# Patient Record
Sex: MALE | Race: OTHER | NOT HISPANIC OR LATINO | URBAN - METROPOLITAN AREA
[De-identification: names, ages, dates, MRNs, and addresses within clinical notes are randomized per-mention and may not be internally consistent; named-entity substitution may affect disease eponyms.]

---

## 2018-03-25 ENCOUNTER — INPATIENT (INPATIENT)
Facility: HOSPITAL | Age: 38
LOS: 2 days | Discharge: LEFT BEFORE DISCHARGE | End: 2018-03-28
Attending: INTERNAL MEDICINE

## 2018-03-25 VITALS
HEART RATE: 94 BPM | TEMPERATURE: 97 F | SYSTOLIC BLOOD PRESSURE: 139 MMHG | RESPIRATION RATE: 18 BRPM | OXYGEN SATURATION: 99 % | DIASTOLIC BLOOD PRESSURE: 81 MMHG

## 2018-03-25 LAB
ALBUMIN SERPL ELPH-MCNC: 4.5 G/DL — SIGNIFICANT CHANGE UP (ref 3.5–5.2)
ALP SERPL-CCNC: 150 U/L — HIGH (ref 30–115)
ALT FLD-CCNC: 47 U/L — HIGH (ref 0–41)
ANION GAP SERPL CALC-SCNC: 19 MMOL/L — HIGH (ref 7–14)
APAP SERPL-MCNC: <5 UG/ML — LOW (ref 10–30)
APPEARANCE UR: CLEAR — SIGNIFICANT CHANGE UP
AST SERPL-CCNC: 128 U/L — HIGH (ref 0–41)
BASOPHILS # BLD AUTO: 0.07 K/UL — SIGNIFICANT CHANGE UP (ref 0–0.2)
BASOPHILS NFR BLD AUTO: 1.3 % — HIGH (ref 0–1)
BILIRUB SERPL-MCNC: 0.8 MG/DL — SIGNIFICANT CHANGE UP (ref 0.2–1.2)
BILIRUB UR-MCNC: NEGATIVE — SIGNIFICANT CHANGE UP
BUN SERPL-MCNC: 12 MG/DL — SIGNIFICANT CHANGE UP (ref 10–20)
CALCIUM SERPL-MCNC: 8.5 MG/DL — SIGNIFICANT CHANGE UP (ref 8.5–10.1)
CHLORIDE SERPL-SCNC: 99 MMOL/L — SIGNIFICANT CHANGE UP (ref 98–110)
CO2 SERPL-SCNC: 25 MMOL/L — SIGNIFICANT CHANGE UP (ref 17–32)
COLOR SPEC: YELLOW — SIGNIFICANT CHANGE UP
CREAT SERPL-MCNC: 0.7 MG/DL — SIGNIFICANT CHANGE UP (ref 0.7–1.5)
DIFF PNL FLD: (no result)
EOSINOPHIL # BLD AUTO: 0.07 K/UL — SIGNIFICANT CHANGE UP (ref 0–0.7)
EOSINOPHIL NFR BLD AUTO: 1.3 % — SIGNIFICANT CHANGE UP (ref 0–8)
ETHANOL SERPL-MCNC: 417 MG/DL — HIGH
GLUCOSE SERPL-MCNC: 100 MG/DL — HIGH (ref 70–99)
GLUCOSE UR QL: NEGATIVE MG/DL — SIGNIFICANT CHANGE UP
HCT VFR BLD CALC: 43.4 % — SIGNIFICANT CHANGE UP (ref 42–52)
HGB BLD-MCNC: 14.9 G/DL — SIGNIFICANT CHANGE UP (ref 14–18)
IMM GRANULOCYTES NFR BLD AUTO: 0.2 % — SIGNIFICANT CHANGE UP (ref 0.1–0.3)
KETONES UR-MCNC: NEGATIVE — SIGNIFICANT CHANGE UP
LEUKOCYTE ESTERASE UR-ACNC: NEGATIVE — SIGNIFICANT CHANGE UP
LYMPHOCYTES # BLD AUTO: 2.31 K/UL — SIGNIFICANT CHANGE UP (ref 1.2–3.4)
LYMPHOCYTES # BLD AUTO: 42.1 % — SIGNIFICANT CHANGE UP (ref 20.5–51.1)
MCHC RBC-ENTMCNC: 32.8 PG — HIGH (ref 27–31)
MCHC RBC-ENTMCNC: 34.3 G/DL — SIGNIFICANT CHANGE UP (ref 32–37)
MCV RBC AUTO: 95.6 FL — HIGH (ref 80–94)
MONOCYTES # BLD AUTO: 0.65 K/UL — HIGH (ref 0.1–0.6)
MONOCYTES NFR BLD AUTO: 11.8 % — HIGH (ref 1.7–9.3)
NEUTROPHILS # BLD AUTO: 2.38 K/UL — SIGNIFICANT CHANGE UP (ref 1.4–6.5)
NEUTROPHILS NFR BLD AUTO: 43.3 % — SIGNIFICANT CHANGE UP (ref 42.2–75.2)
NITRITE UR-MCNC: NEGATIVE — SIGNIFICANT CHANGE UP
PH UR: 6 — SIGNIFICANT CHANGE UP (ref 5–8)
PLATELET # BLD AUTO: 195 K/UL — SIGNIFICANT CHANGE UP (ref 130–400)
POTASSIUM SERPL-MCNC: 3.4 MMOL/L — LOW (ref 3.5–5)
POTASSIUM SERPL-SCNC: 3.4 MMOL/L — LOW (ref 3.5–5)
PROT SERPL-MCNC: 8 G/DL — SIGNIFICANT CHANGE UP (ref 6–8)
PROT UR-MCNC: NEGATIVE MG/DL — SIGNIFICANT CHANGE UP
RBC # BLD: 4.54 M/UL — LOW (ref 4.7–6.1)
RBC # FLD: 13.9 % — SIGNIFICANT CHANGE UP (ref 11.5–14.5)
SALICYLATES SERPL-MCNC: <0.3 MG/DL — LOW (ref 4–30)
SODIUM SERPL-SCNC: 143 MMOL/L — SIGNIFICANT CHANGE UP (ref 135–146)
SP GR SPEC: 1.01 — SIGNIFICANT CHANGE UP (ref 1.01–1.03)
UROBILINOGEN FLD QL: 0.2 MG/DL — SIGNIFICANT CHANGE UP (ref 0.2–0.2)
WBC # BLD: 5.49 K/UL — SIGNIFICANT CHANGE UP (ref 4.8–10.8)
WBC # FLD AUTO: 5.49 K/UL — SIGNIFICANT CHANGE UP (ref 4.8–10.8)

## 2018-03-25 RX ORDER — IBUPROFEN 200 MG
400 TABLET ORAL
Qty: 0 | Refills: 0 | Status: DISCONTINUED | OUTPATIENT
Start: 2018-03-25 | End: 2018-03-28

## 2018-03-25 RX ORDER — THIAMINE MONONITRATE (VIT B1) 100 MG
100 TABLET ORAL DAILY
Qty: 0 | Refills: 0 | Status: COMPLETED | OUTPATIENT
Start: 2018-03-25 | End: 2018-03-28

## 2018-03-25 RX ORDER — HYDROXYZINE HCL 10 MG
100 TABLET ORAL AT BEDTIME
Qty: 0 | Refills: 0 | Status: DISCONTINUED | OUTPATIENT
Start: 2018-03-25 | End: 2018-03-28

## 2018-03-25 RX ORDER — POTASSIUM CHLORIDE 20 MEQ
40 PACKET (EA) ORAL ONCE
Qty: 0 | Refills: 0 | Status: COMPLETED | OUTPATIENT
Start: 2018-03-25 | End: 2018-03-25

## 2018-03-25 RX ORDER — TUBERCULIN PURIFIED PROTEIN DERIVATIVE 5 [IU]/.1ML
5 INJECTION, SOLUTION INTRADERMAL ONCE
Qty: 0 | Refills: 0 | Status: COMPLETED | OUTPATIENT
Start: 2018-03-25 | End: 2018-03-25

## 2018-03-25 RX ORDER — SODIUM CHLORIDE 9 MG/ML
1000 INJECTION INTRAMUSCULAR; INTRAVENOUS; SUBCUTANEOUS
Qty: 0 | Refills: 0 | Status: DISCONTINUED | OUTPATIENT
Start: 2018-03-25 | End: 2018-03-26

## 2018-03-25 RX ORDER — HYDROXYZINE HCL 10 MG
50 TABLET ORAL
Qty: 0 | Refills: 0 | Status: DISCONTINUED | OUTPATIENT
Start: 2018-03-25 | End: 2018-03-28

## 2018-03-25 RX ADMIN — Medication 25 MILLIGRAM(S): at 14:11

## 2018-03-25 RX ADMIN — Medication 25 MILLIGRAM(S): at 10:18

## 2018-03-25 RX ADMIN — Medication 25 MILLIGRAM(S): at 22:01

## 2018-03-25 RX ADMIN — Medication 1 TABLET(S): at 15:06

## 2018-03-25 RX ADMIN — Medication 25 MILLIGRAM(S): at 06:05

## 2018-03-25 RX ADMIN — Medication 25 MILLIGRAM(S): at 16:10

## 2018-03-25 RX ADMIN — Medication 25 MILLIGRAM(S): at 19:58

## 2018-03-25 RX ADMIN — Medication 100 MILLIGRAM(S): at 15:06

## 2018-03-25 RX ADMIN — Medication 25 MILLIGRAM(S): at 17:55

## 2018-03-25 RX ADMIN — Medication 40 MILLIEQUIVALENT(S): at 12:11

## 2018-03-25 RX ADMIN — Medication 25 MILLIGRAM(S): at 12:11

## 2018-03-25 RX ADMIN — Medication 25 MILLIGRAM(S): at 03:53

## 2018-03-25 RX ADMIN — TUBERCULIN PURIFIED PROTEIN DERIVATIVE 5 UNIT(S): 5 INJECTION, SOLUTION INTRADERMAL at 14:52

## 2018-03-25 NOTE — ED PROVIDER NOTE - NS ED ROS FT
Constitutional: See HPI.  Eyes: No visual changes, eye pain or discharge.  ENMT: No hearing changes, pain, discharge or infections. No neck pain or stiffness.  Cardiac: No chest pain, SOB or edema. No chest pain with exertion.  Respiratory: No cough or respiratory distress.   GI: No nausea, vomiting, diarrhea or abdominal pain.  : No dysuria, frequency or burning.  MS: No myalgia, muscle weakness, joint pain or back pain.  Neuro: No headache or weakness. No LOC.  Skin: No skin rash.

## 2018-03-25 NOTE — ED PROVIDER NOTE - OBJECTIVE STATEMENT
38 yo M with a hx of alcoholic hepatitis, pancreatitis, chronic etoh abuse, presents for alcohol detox. Has been drinking 1L of vodka and 10-20 beers daily for the past 3 months. Last drink was 6 hours ago. Denies CP, sob, fevers, chills, tremulousness, abd pain, NVCD, back pain.

## 2018-03-25 NOTE — ED ADULT NURSE REASSESSMENT NOTE - NS ED NURSE REASSESS COMMENT FT1
librium 25mg PO given per Dr Santos, EMS here for pt to transport to [facility] detox, pt cooperative, no issues, no complaints

## 2018-03-25 NOTE — ED PROVIDER NOTE - PHYSICAL EXAMINATION
AOx4, Non toxic appearing, NAD, speaking in full sentences. Skin  warm and dry, no acute rash. Head - ecchymosis and scabbing over nose nand upper lip, healing. PERRLA/EOMI, conjunctiva and sclera clear. MM moist, no nasal discharge.  Pharynx and TM's unremarkable.  No mastoid or temporal ttp. Neck supple nt, no meningeal signs. Heart RRR s1s2 nl, no rub/murmur. Lungs- No retractions, BS equal, CTAB. Abdomen soft ntnd no r/g. Extremities- moves all, +equal distal pulses, brisk cap refill, sensation wnl, normal ROM. No LE edema, calves nttp b/l.

## 2018-03-25 NOTE — ED PROVIDER NOTE - ATTENDING CONTRIBUTION TO CARE
36 y/o M pmh above, here for etoh detox, last drink about 6hr ago.  No SI, HI or hallucinations.  NO complaints; + healing abrasions on nose and upper lip from fall days ago, o/w unremarkable exam.  P: labs, ekg, benzo, send to detox.

## 2018-03-26 LAB — DRUG SCREEN 1, URINE RESULT: SIGNIFICANT CHANGE UP

## 2018-03-26 RX ADMIN — Medication 50 MILLIGRAM(S): at 14:02

## 2018-03-26 RX ADMIN — Medication 25 MILLIGRAM(S): at 00:06

## 2018-03-26 RX ADMIN — Medication 25 MILLIGRAM(S): at 16:04

## 2018-03-26 RX ADMIN — Medication 25 MILLIGRAM(S): at 08:09

## 2018-03-26 RX ADMIN — Medication 25 MILLIGRAM(S): at 18:13

## 2018-03-26 RX ADMIN — Medication 50 MILLIGRAM(S): at 06:17

## 2018-03-26 RX ADMIN — Medication 100 MILLIGRAM(S): at 21:11

## 2018-03-26 RX ADMIN — Medication 100 MILLIGRAM(S): at 00:06

## 2018-03-26 RX ADMIN — Medication 25 MILLIGRAM(S): at 02:11

## 2018-03-26 RX ADMIN — Medication 25 MILLIGRAM(S): at 06:17

## 2018-03-26 RX ADMIN — Medication 25 MILLIGRAM(S): at 14:02

## 2018-03-26 RX ADMIN — Medication 100 MILLIGRAM(S): at 08:09

## 2018-03-26 RX ADMIN — Medication 1 TABLET(S): at 08:09

## 2018-03-26 RX ADMIN — Medication 25 MILLIGRAM(S): at 09:54

## 2018-03-27 RX ORDER — MUPIROCIN 20 MG/G
1 OINTMENT TOPICAL
Qty: 0 | Refills: 0 | Status: DISCONTINUED | OUTPATIENT
Start: 2018-03-27 | End: 2018-03-28

## 2018-03-27 RX ADMIN — Medication 1 TABLET(S): at 09:22

## 2018-03-27 RX ADMIN — TUBERCULIN PURIFIED PROTEIN DERIVATIVE 5 UNIT(S): 5 INJECTION, SOLUTION INTRADERMAL at 13:15

## 2018-03-27 RX ADMIN — Medication 100 MILLIGRAM(S): at 21:08

## 2018-03-27 RX ADMIN — Medication 25 MILLIGRAM(S): at 09:22

## 2018-03-27 RX ADMIN — Medication 50 MILLIGRAM(S): at 00:11

## 2018-03-27 RX ADMIN — Medication 100 MILLIGRAM(S): at 09:22

## 2018-03-27 RX ADMIN — Medication 25 MILLIGRAM(S): at 00:10

## 2018-03-27 RX ADMIN — Medication 30 MILLILITER(S): at 06:33

## 2018-03-27 RX ADMIN — MUPIROCIN 1 APPLICATION(S): 20 OINTMENT TOPICAL at 21:25

## 2018-03-27 RX ADMIN — Medication 25 MILLIGRAM(S): at 06:32

## 2018-03-27 RX ADMIN — MUPIROCIN 1 APPLICATION(S): 20 OINTMENT TOPICAL at 12:10

## 2018-03-28 VITALS
TEMPERATURE: 96 F | DIASTOLIC BLOOD PRESSURE: 92 MMHG | SYSTOLIC BLOOD PRESSURE: 139 MMHG | RESPIRATION RATE: 16 BRPM | HEART RATE: 118 BPM

## 2018-03-28 RX ADMIN — Medication 100 MILLIGRAM(S): at 09:19

## 2018-03-28 RX ADMIN — Medication 25 MILLIGRAM(S): at 09:27

## 2018-03-28 RX ADMIN — Medication 1 TABLET(S): at 09:19

## 2018-03-28 RX ADMIN — Medication 50 MILLIGRAM(S): at 09:28

## 2018-03-29 LAB
AMPHETAMINES BLD QL SCN: NEGATIVE — SIGNIFICANT CHANGE UP
BARBITURATES SERPLBLD QL: NEGATIVE — SIGNIFICANT CHANGE UP
BENZODIAZAPINES, SERUM: POSITIVE
CANNABINOIDS SERPLBLD QL SCN: NEGATIVE — SIGNIFICANT CHANGE UP
COCAINE+BZE SERPLBLD QL SCN: NEGATIVE — SIGNIFICANT CHANGE UP
METHADONE SERPL-MCNC: NEGATIVE — SIGNIFICANT CHANGE UP
OPIATES SERPL QL: NEGATIVE — SIGNIFICANT CHANGE UP
PCP BLD QL SCN: NEGATIVE — SIGNIFICANT CHANGE UP

## 2018-04-02 DIAGNOSIS — Z86.19 PERSONAL HISTORY OF OTHER INFECTIOUS AND PARASITIC DISEASES: ICD-10-CM

## 2018-04-02 DIAGNOSIS — F10.20 ALCOHOL DEPENDENCE, UNCOMPLICATED: ICD-10-CM

## 2018-04-02 DIAGNOSIS — Z87.19 PERSONAL HISTORY OF OTHER DISEASES OF THE DIGESTIVE SYSTEM: ICD-10-CM

## 2018-04-02 DIAGNOSIS — G47.00 INSOMNIA, UNSPECIFIED: ICD-10-CM

## 2018-04-02 DIAGNOSIS — E87.6 HYPOKALEMIA: ICD-10-CM

## 2018-04-02 DIAGNOSIS — R25.1 TREMOR, UNSPECIFIED: ICD-10-CM
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
## 2018-04-02 DIAGNOSIS — F41.9 ANXIETY DISORDER, UNSPECIFIED: ICD-10-CM

## 2018-10-01 ENCOUNTER — OUTPATIENT (OUTPATIENT)
Dept: OUTPATIENT SERVICES | Facility: HOSPITAL | Age: 38
LOS: 1 days | End: 2018-10-01
Payer: MEDICAID

## 2018-10-01 PROCEDURE — G9001: CPT

## 2018-10-12 DIAGNOSIS — Z71.89 OTHER SPECIFIED COUNSELING: ICD-10-CM

## 2018-12-31 ENCOUNTER — INPATIENT (INPATIENT)
Facility: HOSPITAL | Age: 38
LOS: 3 days | Discharge: NURSING, REHAB OR LONG-TERM CARE | End: 2019-01-04
Attending: INTERNAL MEDICINE | Admitting: INTERNAL MEDICINE

## 2018-12-31 VITALS
WEIGHT: 164.91 LBS | DIASTOLIC BLOOD PRESSURE: 84 MMHG | TEMPERATURE: 96 F | HEART RATE: 91 BPM | HEIGHT: 70 IN | SYSTOLIC BLOOD PRESSURE: 145 MMHG | RESPIRATION RATE: 16 BRPM

## 2018-12-31 DIAGNOSIS — F10.20 ALCOHOL DEPENDENCE, UNCOMPLICATED: ICD-10-CM

## 2018-12-31 LAB
ALBUMIN SERPL ELPH-MCNC: 4.5 G/DL — SIGNIFICANT CHANGE UP (ref 3.5–5.2)
ALP SERPL-CCNC: 101 U/L — SIGNIFICANT CHANGE UP (ref 30–115)
ALT FLD-CCNC: 19 U/L — SIGNIFICANT CHANGE UP (ref 0–41)
AMMONIA BLD-MCNC: 30 UMOL/L — SIGNIFICANT CHANGE UP (ref 11–55)
AMPHET UR-MCNC: NEGATIVE — SIGNIFICANT CHANGE UP
AMYLASE P1 CFR SERPL: 40 U/L — SIGNIFICANT CHANGE UP (ref 25–115)
ANION GAP SERPL CALC-SCNC: 9 MMOL/L — SIGNIFICANT CHANGE UP (ref 7–14)
APPEARANCE UR: CLEAR — SIGNIFICANT CHANGE UP
APTT BLD: 28.9 SEC — SIGNIFICANT CHANGE UP (ref 27–39.2)
AST SERPL-CCNC: 27 U/L — SIGNIFICANT CHANGE UP (ref 0–41)
BARBITURATES UR SCN-MCNC: NEGATIVE — SIGNIFICANT CHANGE UP
BASOPHILS # BLD AUTO: 0.03 K/UL — SIGNIFICANT CHANGE UP (ref 0–0.2)
BASOPHILS NFR BLD AUTO: 0.8 % — SIGNIFICANT CHANGE UP (ref 0–1)
BENZODIAZ UR-MCNC: NEGATIVE — SIGNIFICANT CHANGE UP
BILIRUB SERPL-MCNC: 0.9 MG/DL — SIGNIFICANT CHANGE UP (ref 0.2–1.2)
BILIRUB UR-MCNC: NEGATIVE — SIGNIFICANT CHANGE UP
BUN SERPL-MCNC: 13 MG/DL — SIGNIFICANT CHANGE UP (ref 10–20)
CALCIUM SERPL-MCNC: 9.8 MG/DL — SIGNIFICANT CHANGE UP (ref 8.5–10.1)
CHLORIDE SERPL-SCNC: 102 MMOL/L — SIGNIFICANT CHANGE UP (ref 98–110)
CHOLEST SERPL-MCNC: 243 MG/DL — HIGH (ref 100–200)
CO2 SERPL-SCNC: 32 MMOL/L — SIGNIFICANT CHANGE UP (ref 17–32)
COCAINE METAB.OTHER UR-MCNC: NEGATIVE — SIGNIFICANT CHANGE UP
COLOR SPEC: YELLOW — SIGNIFICANT CHANGE UP
CREAT SERPL-MCNC: 0.8 MG/DL — SIGNIFICANT CHANGE UP (ref 0.7–1.5)
DIFF PNL FLD: ABNORMAL
DRUG SCREEN 1, URINE RESULT: SIGNIFICANT CHANGE UP
EOSINOPHIL # BLD AUTO: 0.1 K/UL — SIGNIFICANT CHANGE UP (ref 0–0.7)
EOSINOPHIL NFR BLD AUTO: 2.8 % — SIGNIFICANT CHANGE UP (ref 0–8)
ETHANOL SERPL-MCNC: 40 MG/DL — HIGH
GGT SERPL-CCNC: 350 U/L — HIGH (ref 1–40)
GLUCOSE SERPL-MCNC: 92 MG/DL — SIGNIFICANT CHANGE UP (ref 70–99)
GLUCOSE UR QL: NEGATIVE MG/DL — SIGNIFICANT CHANGE UP
HCT VFR BLD CALC: 41.2 % — LOW (ref 42–52)
HDLC SERPL-MCNC: 144 MG/DL — SIGNIFICANT CHANGE UP
HGB BLD-MCNC: 13.8 G/DL — LOW (ref 14–18)
IMM GRANULOCYTES NFR BLD AUTO: 0.3 % — SIGNIFICANT CHANGE UP (ref 0.1–0.3)
INR BLD: 0.98 RATIO — SIGNIFICANT CHANGE UP (ref 0.65–1.3)
KETONES UR-MCNC: NEGATIVE — SIGNIFICANT CHANGE UP
LEUKOCYTE ESTERASE UR-ACNC: NEGATIVE — SIGNIFICANT CHANGE UP
LIPID PNL WITH DIRECT LDL SERPL: 100 MG/DL — SIGNIFICANT CHANGE UP (ref 4–129)
LYMPHOCYTES # BLD AUTO: 1.22 K/UL — SIGNIFICANT CHANGE UP (ref 1.2–3.4)
LYMPHOCYTES # BLD AUTO: 33.7 % — SIGNIFICANT CHANGE UP (ref 20.5–51.1)
MAGNESIUM SERPL-MCNC: 1.9 MG/DL — SIGNIFICANT CHANGE UP (ref 1.8–2.4)
MCHC RBC-ENTMCNC: 32.6 PG — HIGH (ref 27–31)
MCHC RBC-ENTMCNC: 33.5 G/DL — SIGNIFICANT CHANGE UP (ref 32–37)
MCV RBC AUTO: 97.4 FL — HIGH (ref 80–94)
METHADONE UR-MCNC: NEGATIVE — SIGNIFICANT CHANGE UP
MONOCYTES # BLD AUTO: 0.52 K/UL — SIGNIFICANT CHANGE UP (ref 0.1–0.6)
MONOCYTES NFR BLD AUTO: 14.4 % — HIGH (ref 1.7–9.3)
NEUTROPHILS # BLD AUTO: 1.74 K/UL — SIGNIFICANT CHANGE UP (ref 1.4–6.5)
NEUTROPHILS NFR BLD AUTO: 48 % — SIGNIFICANT CHANGE UP (ref 42.2–75.2)
NITRITE UR-MCNC: NEGATIVE — SIGNIFICANT CHANGE UP
NRBC # BLD: 0 /100 WBCS — SIGNIFICANT CHANGE UP (ref 0–0)
OPIATES UR-MCNC: NEGATIVE — SIGNIFICANT CHANGE UP
PCP UR-MCNC: NEGATIVE — SIGNIFICANT CHANGE UP
PH UR: 6.5 — SIGNIFICANT CHANGE UP (ref 5–8)
PLATELET # BLD AUTO: 164 K/UL — SIGNIFICANT CHANGE UP (ref 130–400)
POTASSIUM SERPL-MCNC: 5.1 MMOL/L — HIGH (ref 3.5–5)
POTASSIUM SERPL-SCNC: 5.1 MMOL/L — HIGH (ref 3.5–5)
PROPOXYPHENE QUALITATIVE URINE RESULT: NEGATIVE — SIGNIFICANT CHANGE UP
PROT SERPL-MCNC: 7.7 G/DL — SIGNIFICANT CHANGE UP (ref 6–8)
PROT UR-MCNC: NEGATIVE MG/DL — SIGNIFICANT CHANGE UP
PROTHROM AB SERPL-ACNC: 10.6 SEC — SIGNIFICANT CHANGE UP (ref 9.95–12.87)
RBC # BLD: 4.23 M/UL — LOW (ref 4.7–6.1)
RBC # FLD: 15.4 % — HIGH (ref 11.5–14.5)
RBC CASTS # UR COMP ASSIST: ABNORMAL /HPF
SODIUM SERPL-SCNC: 143 MMOL/L — SIGNIFICANT CHANGE UP (ref 135–146)
SP GR SPEC: 1.02 — SIGNIFICANT CHANGE UP (ref 1.01–1.03)
THC UR QL: NEGATIVE — SIGNIFICANT CHANGE UP
TOTAL CHOLESTEROL/HDL RATIO MEASUREMENT: 1.7 RATIO — LOW (ref 4–5.5)
TRIGL SERPL-MCNC: 103 MG/DL — SIGNIFICANT CHANGE UP (ref 10–149)
UROBILINOGEN FLD QL: 0.2 MG/DL — SIGNIFICANT CHANGE UP (ref 0.2–0.2)
WBC # BLD: 3.62 K/UL — LOW (ref 4.8–10.8)
WBC # FLD AUTO: 3.62 K/UL — LOW (ref 4.8–10.8)
WBC UR QL: SIGNIFICANT CHANGE UP /HPF

## 2018-12-31 RX ORDER — HYDROXYZINE HCL 10 MG
50 TABLET ORAL EVERY 6 HOURS
Qty: 0 | Refills: 0 | Status: DISCONTINUED | OUTPATIENT
Start: 2018-12-31 | End: 2019-01-04

## 2018-12-31 RX ORDER — NICOTINE POLACRILEX 2 MG
1 GUM BUCCAL DAILY
Qty: 0 | Refills: 0 | Status: DISCONTINUED | OUTPATIENT
Start: 2018-12-31 | End: 2019-01-04

## 2018-12-31 RX ORDER — ACETAMINOPHEN 500 MG
650 TABLET ORAL EVERY 4 HOURS
Qty: 0 | Refills: 0 | Status: DISCONTINUED | OUTPATIENT
Start: 2018-12-31 | End: 2019-01-04

## 2018-12-31 RX ORDER — THIAMINE MONONITRATE (VIT B1) 100 MG
100 TABLET ORAL DAILY
Qty: 0 | Refills: 0 | Status: COMPLETED | OUTPATIENT
Start: 2018-12-31 | End: 2019-01-02

## 2018-12-31 RX ORDER — HYDROXYZINE HCL 10 MG
100 TABLET ORAL AT BEDTIME
Qty: 0 | Refills: 0 | Status: DISCONTINUED | OUTPATIENT
Start: 2018-12-31 | End: 2019-01-04

## 2018-12-31 RX ORDER — PSEUDOEPHEDRINE HCL 30 MG
60 TABLET ORAL EVERY 6 HOURS
Qty: 0 | Refills: 0 | Status: DISCONTINUED | OUTPATIENT
Start: 2018-12-31 | End: 2019-01-04

## 2018-12-31 RX ORDER — MULTIVIT-MIN/FERROUS GLUCONATE 9 MG/15 ML
1 LIQUID (ML) ORAL DAILY
Qty: 0 | Refills: 0 | Status: DISCONTINUED | OUTPATIENT
Start: 2018-12-31 | End: 2019-01-04

## 2018-12-31 RX ORDER — FOLIC ACID 0.8 MG
1 TABLET ORAL DAILY
Qty: 0 | Refills: 0 | Status: DISCONTINUED | OUTPATIENT
Start: 2018-12-31 | End: 2019-01-04

## 2018-12-31 RX ORDER — METHOCARBAMOL 500 MG/1
500 TABLET, FILM COATED ORAL EVERY 6 HOURS
Qty: 0 | Refills: 0 | Status: DISCONTINUED | OUTPATIENT
Start: 2018-12-31 | End: 2019-01-04

## 2018-12-31 RX ORDER — IBUPROFEN 200 MG
600 TABLET ORAL EVERY 6 HOURS
Qty: 0 | Refills: 0 | Status: DISCONTINUED | OUTPATIENT
Start: 2018-12-31 | End: 2019-01-04

## 2018-12-31 RX ORDER — MAGNESIUM HYDROXIDE 400 MG/1
30 TABLET, CHEWABLE ORAL ONCE
Qty: 0 | Refills: 0 | Status: DISCONTINUED | OUTPATIENT
Start: 2018-12-31 | End: 2019-01-04

## 2018-12-31 RX ADMIN — Medication 50 MILLIGRAM(S): at 21:03

## 2018-12-31 RX ADMIN — Medication 50 MILLIGRAM(S): at 13:47

## 2018-12-31 RX ADMIN — Medication 1 TABLET(S): at 13:47

## 2018-12-31 RX ADMIN — Medication 1 MILLIGRAM(S): at 13:47

## 2018-12-31 RX ADMIN — Medication 100 MILLIGRAM(S): at 13:47

## 2018-12-31 RX ADMIN — Medication 50 MILLIGRAM(S): at 18:03

## 2019-01-01 LAB
ESTIMATED AVERAGE GLUCOSE: 91 MG/DL — SIGNIFICANT CHANGE UP (ref 68–114)
HAV IGM SER-ACNC: SIGNIFICANT CHANGE UP
HBA1C BLD-MCNC: 4.8 % — SIGNIFICANT CHANGE UP (ref 4–5.6)
HBV CORE IGM SER-ACNC: SIGNIFICANT CHANGE UP
HBV SURFACE AG SER-ACNC: SIGNIFICANT CHANGE UP
HCV AB S/CO SERPL IA: 0.08 S/CO — SIGNIFICANT CHANGE UP
HCV AB SERPL-IMP: SIGNIFICANT CHANGE UP
T PALLIDUM AB TITR SER: NEGATIVE — SIGNIFICANT CHANGE UP

## 2019-01-01 RX ORDER — HYDROCORTISONE 1 %
1 OINTMENT (GRAM) TOPICAL
Qty: 0 | Refills: 0 | Status: DISCONTINUED | OUTPATIENT
Start: 2019-01-01 | End: 2019-01-04

## 2019-01-01 RX ADMIN — Medication 1 APPLICATION(S): at 21:34

## 2019-01-01 RX ADMIN — Medication 50 MILLIGRAM(S): at 04:30

## 2019-01-01 RX ADMIN — Medication 25 MILLIGRAM(S): at 13:11

## 2019-01-01 RX ADMIN — Medication 50 MILLIGRAM(S): at 09:24

## 2019-01-01 RX ADMIN — Medication 100 MILLIGRAM(S): at 09:23

## 2019-01-01 RX ADMIN — Medication 25 MILLIGRAM(S): at 21:34

## 2019-01-01 RX ADMIN — Medication 50 MILLIGRAM(S): at 06:07

## 2019-01-01 RX ADMIN — Medication 1 MILLIGRAM(S): at 09:23

## 2019-01-01 RX ADMIN — Medication 25 MILLIGRAM(S): at 17:45

## 2019-01-01 RX ADMIN — Medication 1 TABLET(S): at 09:23

## 2019-01-01 NOTE — PROGRESS NOTE ADULT - SUBJECTIVE AND OBJECTIVE BOX
Pt admitted from intake  for etoh detox  last one in sept elsewhere  relapsed 1 mo ago  Posssible Sz few days ago  and in the past      SOCIAL HISTORY: ETOH    REVIEW OF SYSTEMS:    Constitutional: No fever, weight loss or fatigue  ENT:  No difficulty hearing, tinnitus, vertigo; No sinus or throat pain  Neck: No pain or stiffness  Respiratory: No cough, wheezing, chills or hemoptysis  Cardiovascular: No chest pain, palpitations, shortness of breath, dizziness or leg swelling  Gastrointestinal: No abdominal or epigastric pain. No nausea, vomiting or hematemesis; No diarrhea or constipation. No melena or hematochezia.  Neurological: No headaches, memory loss, loss of strength, numbness or tremors  Musculoskeletal: No joint pain or swelling; No muscle, back or extremity pain  Psychiatric: No depression, anxiety, mood swings or difficulty sleeping    MEDICATIONS  (STANDING):  chlordiazePOXIDE   Oral   chlordiazePOXIDE 25 milliGRAM(s) Oral every 4 hours  folic acid 1 milliGRAM(s) Oral daily  multivitamin/minerals 1 Tablet(s) Oral daily  nicotine - 21 mG/24Hr(s) Patch 1 Patch Transdermal daily  thiamine 100 milliGRAM(s) Oral daily    MEDICATIONS  (PRN):  acetaminophen   Tablet .. 650 milliGRAM(s) Oral every 4 hours PRN Temp greater or equal to 38.5C (101.3F)  aluminum hydroxide/magnesium hydroxide/simethicone Suspension 30 milliLiter(s) Oral every 6 hours PRN Heartburn  bismuth subsalicylate Liquid 30 milliLiter(s) Oral every 6 hours PRN Diarrhea  chlordiazePOXIDE 50 milliGRAM(s) Oral every 4 hours PRN Withdrawal  cloNIDine 0.1 milliGRAM(s) Oral every 8 hours PRN Blood Pressure GREATER THAN 140/90 mmHG  guaiFENesin/dextromethorphan  Syrup 5 milliLiter(s) Oral every 4 hours PRN Cough  hydrOXYzine hydrochloride 50 milliGRAM(s) Oral every 6 hours PRN Anxiety  hydrOXYzine hydrochloride 100 milliGRAM(s) Oral at bedtime PRN insomnia  ibuprofen  Tablet. 600 milliGRAM(s) Oral every 6 hours PRN Mild Pain (1 - 3)  magnesium hydroxide Suspension 30 milliLiter(s) Oral once PRN Constipation  methocarbamol 500 milliGRAM(s) Oral every 6 hours PRN muscle pain  pseudoephedrine 60 milliGRAM(s) Oral every 6 hours PRN Rhinitis  trimethobenzamide 300 milliGRAM(s) Oral every 6 hours PRN Nausea and/or Vomiting  trimethobenzamide Injectable 200 milliGRAM(s) IntraMuscular every 6 hours PRN Nausea and/or Vomiting      Vital Signs Last 24 Hrs  T(C): 36.3 (2019 06:00), Max: 36.3 (2019 06:00)  T(F): 97.4 (2019 06:00), Max: 97.4 (2019 06:00)  HR: 80 (2019 06:00) (80 - 91)  BP: 101/59 (2019 06:00) (101/59 - 145/84)  BP(mean): --  RR: 16 (2019 06:00) (16 - 16)  SpO2: --    PHYSICAL EXAM:    Constitutional: NAD, well-groomed, well-developed  HEENT: PERRLA, EOMI, Normal Hearing, MMM  Neck: No LAD, No JVD  Back: Normal spine flexure, No CVA tenderness  Respiratory: CTAB/L  Cardiovascular: S1 and S2, RRR, no M/G/R  Gastrointestinal: BS+, soft, NT/ND  Extremities: No peripheral edema  Vascular: 2+ peripheral pulses  Neurological: A/O x 3, no focal deficits    LABS:                        13.8   3.62  )-----------( 164      ( 31 Dec 2018 21:03 )             41.2         143  |  102  |  13  ----------------------------<  92  5.1<H>   |  32  |  0.8    Ca    9.8      31 Dec 2018 21:03  Mg     1.9         TPro  7.7  /  Alb  4.5  /  TBili  0.9  /  DBili  x   /  AST  27  /  ALT  19  /  AlkPhos  101      PT/INR - ( 31 Dec 2018 21:03 )   PT: 10.60 sec;   INR: 0.98 ratio         PTT - ( 31 Dec 2018 21:03 )  PTT:28.9 sec  Urinalysis Basic - ( 31 Dec 2018 12:41 )    Color: Yellow / Appearance: Clear / S.025 / pH: x  Gluc: x / Ketone: Negative  / Bili: Negative / Urobili: 0.2 mg/dL   Blood: x / Protein: Negative mg/dL / Nitrite: Negative   Leuk Esterase: Negative / RBC: 6-10 /HPF / WBC 3-5 /HPF   Sq Epi: x / Non Sq Epi: x / Bacteria: x      Drug Screen Urine:  Alcohol Level  Alcohol, Blood: 40 mg/dL (18 @ 21:03)        RADIOLOGY & ADDITIONAL STUDIES: Admit Note:    Pt admitted from intake  for etoh detox  last one in sept elsewhere  relapsed 1 mo ago  Posssible Sz few days ago  and in the past      SOCIAL HISTORY: ETOH    REVIEW OF SYSTEMS:    Constitutional: No fever, weight loss or fatigue  ENT:  No difficulty hearing, tinnitus, vertigo; No sinus or throat pain  Neck: No pain or stiffness  Respiratory: No cough, wheezing, chills or hemoptysis  Cardiovascular: No chest pain, palpitations, shortness of breath, dizziness or leg swelling  Gastrointestinal: No abdominal or epigastric pain. No nausea, vomiting or hematemesis; No diarrhea or constipation. No melena or hematochezia.  Neurological: No headaches, memory loss, loss of strength, numbness or tremors  Musculoskeletal: No joint pain or swelling; No muscle, back or extremity pain  Psychiatric: No depression, anxiety, mood swings or difficulty sleeping    MEDICATIONS  (STANDING):  chlordiazePOXIDE   Oral   chlordiazePOXIDE 25 milliGRAM(s) Oral every 4 hours  folic acid 1 milliGRAM(s) Oral daily  multivitamin/minerals 1 Tablet(s) Oral daily  nicotine - 21 mG/24Hr(s) Patch 1 Patch Transdermal daily  thiamine 100 milliGRAM(s) Oral daily    MEDICATIONS  (PRN):  acetaminophen   Tablet .. 650 milliGRAM(s) Oral every 4 hours PRN Temp greater or equal to 38.5C (101.3F)  aluminum hydroxide/magnesium hydroxide/simethicone Suspension 30 milliLiter(s) Oral every 6 hours PRN Heartburn  bismuth subsalicylate Liquid 30 milliLiter(s) Oral every 6 hours PRN Diarrhea  chlordiazePOXIDE 50 milliGRAM(s) Oral every 4 hours PRN Withdrawal  cloNIDine 0.1 milliGRAM(s) Oral every 8 hours PRN Blood Pressure GREATER THAN 140/90 mmHG  guaiFENesin/dextromethorphan  Syrup 5 milliLiter(s) Oral every 4 hours PRN Cough  hydrOXYzine hydrochloride 50 milliGRAM(s) Oral every 6 hours PRN Anxiety  hydrOXYzine hydrochloride 100 milliGRAM(s) Oral at bedtime PRN insomnia  ibuprofen  Tablet. 600 milliGRAM(s) Oral every 6 hours PRN Mild Pain (1 - 3)  magnesium hydroxide Suspension 30 milliLiter(s) Oral once PRN Constipation  methocarbamol 500 milliGRAM(s) Oral every 6 hours PRN muscle pain  pseudoephedrine 60 milliGRAM(s) Oral every 6 hours PRN Rhinitis  trimethobenzamide 300 milliGRAM(s) Oral every 6 hours PRN Nausea and/or Vomiting  trimethobenzamide Injectable 200 milliGRAM(s) IntraMuscular every 6 hours PRN Nausea and/or Vomiting      Vital Signs Last 24 Hrs  T(C): 36.3 (2019 06:00), Max: 36.3 (2019 06:00)  T(F): 97.4 (2019 06:00), Max: 97.4 (2019 06:00)  HR: 80 (2019 06:00) (80 - 91)  BP: 101/59 (2019 06:00) (101/59 - 145/84)  BP(mean): --  RR: 16 (2019 06:00) (16 - 16)  SpO2: --    PHYSICAL EXAM:    Constitutional: NAD, well-groomed, well-developed  HEENT: PERRLA, EOMI, Normal Hearing, MMM  Neck: No LAD, No JVD  Back: Normal spine flexure, No CVA tenderness  Respiratory: CTAB/L  Cardiovascular: S1 and S2, RRR, no M/G/R  Gastrointestinal: BS+, soft, NT/ND  Extremities: No peripheral edema  Vascular: 2+ peripheral pulses  Neurological: A/O x 3, no focal deficits    LABS:                        13.8   3.62  )-----------( 164      ( 31 Dec 2018 21:03 )             41.2         143  |  102  |  13  ----------------------------<  92  5.1<H>   |  32  |  0.8    Ca    9.8      31 Dec 2018 21:03  Mg     1.9         TPro  7.7  /  Alb  4.5  /  TBili  0.9  /  DBili  x   /  AST  27  /  ALT  19  /  AlkPhos  101      PT/INR - ( 31 Dec 2018 21:03 )   PT: 10.60 sec;   INR: 0.98 ratio         PTT - ( 31 Dec 2018 21:03 )  PTT:28.9 sec  Urinalysis Basic - ( 31 Dec 2018 12:41 )    Color: Yellow / Appearance: Clear / S.025 / pH: x  Gluc: x / Ketone: Negative  / Bili: Negative / Urobili: 0.2 mg/dL   Blood: x / Protein: Negative mg/dL / Nitrite: Negative   Leuk Esterase: Negative / RBC: 6-10 /HPF / WBC 3-5 /HPF   Sq Epi: x / Non Sq Epi: x / Bacteria: x      Drug Screen Urine:  Alcohol Level  Alcohol, Blood: 40 mg/dL (18 @ 21:03)        RADIOLOGY & ADDITIONAL STUDIES:

## 2019-01-02 DIAGNOSIS — F41.9 ANXIETY DISORDER, UNSPECIFIED: ICD-10-CM

## 2019-01-02 DIAGNOSIS — F10.20 ALCOHOL DEPENDENCE, UNCOMPLICATED: ICD-10-CM

## 2019-01-02 DIAGNOSIS — K75.9 INFLAMMATORY LIVER DISEASE, UNSPECIFIED: ICD-10-CM

## 2019-01-02 DIAGNOSIS — F10.239 ALCOHOL DEPENDENCE WITH WITHDRAWAL, UNSPECIFIED: ICD-10-CM

## 2019-01-02 DIAGNOSIS — K85.20 ALCOHOL INDUCED ACUTE PANCREATITIS WITHOUT NECROSIS OR INFECTION: ICD-10-CM

## 2019-01-02 RX ADMIN — Medication 1 APPLICATION(S): at 21:03

## 2019-01-02 RX ADMIN — Medication 20 MILLIGRAM(S): at 13:06

## 2019-01-02 RX ADMIN — Medication 20 MILLIGRAM(S): at 18:10

## 2019-01-02 RX ADMIN — Medication 100 MILLIGRAM(S): at 09:08

## 2019-01-02 RX ADMIN — Medication 25 MILLIGRAM(S): at 01:02

## 2019-01-02 RX ADMIN — Medication 25 MILLIGRAM(S): at 06:25

## 2019-01-02 RX ADMIN — Medication 1 TABLET(S): at 09:08

## 2019-01-02 RX ADMIN — Medication 20 MILLIGRAM(S): at 21:03

## 2019-01-02 RX ADMIN — Medication 1 APPLICATION(S): at 09:09

## 2019-01-02 RX ADMIN — Medication 1 MILLIGRAM(S): at 09:08

## 2019-01-02 RX ADMIN — Medication 25 MILLIGRAM(S): at 09:09

## 2019-01-02 RX ADMIN — Medication 100 MILLIGRAM(S): at 02:44

## 2019-01-02 NOTE — H&P ADULT - ATTENDING COMMENTS
as above  Patient interviewed and examined.    Chart reviewed.    PA's H&P noted and modified, as appropriate.    Case discussed on team rounds    Following is my summary of the case.    Admitted for detox: from ____ED, _x__Intake, ____Med/Surg Floor    Alcohol__x__   Opioid_____  Benzo___ Other_____    Substance amount, duration of use, last usage, and prior attempts at detox or rehabs, are outlined above in the H&P and discussed with patient.    Associated withdrawal symptoms presents.  Comorbid conditions noted. Chronic and Stable.    Past Medical Hx, Psych Hx, family Hx, Social Hx from H&P reviewed and NO changes.    Old medical record and medication Hx. Reviewed    Following items reviewed and addressed:  1. labs  2. EKG  3. Imaging from PACs module    Examination: no change from PA's exam.    Place on following protocol  _____Medically Managed  __X__Medically Supervised    Ciwa_____Librium taper__x__Ativan taper___Methadone taper___ Phenobarb taper____ Suboxone Induction____MMTP____    Narcan Kit Offered    Psych Consult __X__N/A  ___Ordered    Physical Therapy  ___X N/A   ___  Ordered    Aftercare disposition to be addressed by counselors.    Estimated length of stay 3-5 days.

## 2019-01-02 NOTE — H&P ADULT - PROBLEM SELECTOR PLAN 5
Observation  Atarax 50 mg po Q6H  PRN anxiety  Atarax 100 mg po QHS PRN Insomnia  Psych. Consult Prn

## 2019-01-02 NOTE — H&P ADULT - NSHPPHYSICALEXAM_GEN_ALL_CORE
-  Vital Signs:      Temp:  97.8    Pulse:  92     RR:  16     BP: 118/78           BTZ:  0.251              Constitutional: anxious A&Ox3, WD/WN,Intoxicated.  Eyes: PERRLA  Respiratory: +air entry, no rales, no rhonchi, no wheezes  Cardiovascular: +S1 and S2,RRR  Gastrointestinal: +BS, soft, non-tender, not distended, No CVAT  Extremities: no cyanosis, no edema, no calf tenderness,   Vascular: +dorsal pedis and radial pulses, no extremity cyanosis  Neurological: sensation intact, ROM equal B/L, CN II-XII intact, Gait: steady  Skin: no rashes, normal turgor, No track marks   No Decubiti present  No IV lines present  Rectal/Breasts Exam: Deferred

## 2019-01-02 NOTE — H&P ADULT - HISTORY OF PRESENT ILLNESS
This a 39 Y/O White Male  With Hx of Alcohol Dependency, denied any Drugs or IVD. prior hx of  >25 Dtoxes and Rehab in the past,l ast Detox at [place] 9/2018,stayed Clean for 2 Months,  Relapsed a month ago.    DRUG	AGE OF ONSET	ROUTE	FREQ	AMOUNT	LAST USE	LENGTH OF CURRENT USE	  ALCOHOL	18 Y/O	PO	Daily	A Liter of Vodka or a a Case of Beer	12/30/2018 30 Mix Drink	4 Weeks	  Pt denied any drugs or IVDA							  							  							  							    Alcohol W/D HX:  (+) Tremors, (+) Blackout   (+) Questionable W/D Seizure 12/29/2018                                    (+) Hot &cold Flashes,(+)Anxiety,(+)Insomnia,(+)Body aches,(+)Poor Appetite  (-)DT’s   (-) Hallucination         Opiate W/D HX:  (+)Myalgia,(+)N/V/D,(+) Diaphoresis,(+) Anxiety,(+)Insomnia,(+)Piloerection,(+)Lacrimation    (+) Hx of Overdose :            No                                    Patient  denied  any Chest Pain, SOB, Abdominal Pain, HA, Blurry Vision, Bleeding ,or Dysuria  Hx x of Withdrawal Seizures: Questonable  ( in 12/29/2018)  MMTP:       NO  Psyhx: Anxiety             Denies any S/H Ideation or A/V Hallucination  Screening history	Last tested	Result	History of treatment	  HIV	9/2018	NEG	N/A	  Hepatitis C	9/2018	NEG	N/A	  Quantiferon GOLD TB test	9/2018	NEG	N/A	    Immunization	Not Received	Unknown	Received	Date Received 	  Influenza		v			  Pneumococcus		v			  Tetanus			v	<10 years	  Others					  					    I-Stop: Negative This a 37 Y/O White Male  With Hx of Alcohol Dependency, denied any Drugs or IVD. prior hx of  >25 Dtoxes and Rehab in the past,l ast Detox at [place] 9/2018,stayed Clean for 2 Months,  Relapsed a month ago.    DRUG	AGE OF ONSET	ROUTE	FREQ	AMOUNT	LAST USE	LENGTH OF CURRENT USE	  ALCOHOL	16 Y/O	PO	Daily	A Liter of Vodka or a a Case of Beer	12/30/2018 30 Mix Drink	4 Weeks	  Pt denied any drugs or IVDA							  							  							  							    Alcohol W/D HX:  (+) Tremors, (+) Blackout   (+) Questionable W/D Seizure 12/29/2018                                      (+) Hot &cold Flashes,(+)Anxiety,(+)Insomnia,(+)Body aches,(+)Poor Appetite                                       (-)DT’s   (-) Hallucination         Opiate W/D HX:  (+)Myalgia,(+)N/V/D,(+) Diaphoresis,(+) Anxiety,(+)Insomnia,(+)Piloerection,(+)Lacrimation    (+) Hx of Overdose :            No                                    Patient  denied  any Chest Pain, SOB, Abdominal Pain, HA, Blurry Vision, Bleeding ,or Dysuria  Hx x of Withdrawal Seizures: Questonable  ( in 12/29/2018)  MMTP:       NO  Psyhx: Anxiety             Denies any S/H Ideation or A/V Hallucination  Screening history	Last tested	Result	History of treatment	  HIV	9/2018	NEG	N/A	  Hepatitis C	9/2018	NEG	N/A	  Quantiferon GOLD TB test	9/2018	NEG	N/A	    Immunization	Not Received	Unknown	Received	Date Received 	  Influenza		v			  Pneumococcus		v			  Tetanus			v	<10 years	  Others					  					    I-Stop: Negative

## 2019-01-02 NOTE — H&P ADULT - ASSESSMENT
This a 39 Y/O White Male  With Hx of Alcohol Dependency, denied any Drugs or IVD. prior hx of  >25 Dtoxes and Rehab in the past,l ast Detox at [place] 9/2018,stayed Clean for 2 Months,  Relapsed a month ago.

## 2019-01-02 NOTE — H&P ADULT - PROBLEM SELECTOR PLAN 1
Check Alcohol Level, Check Urine Toxicology  librium taper Protocol  Thiamine 100mg PO daily  Folic Acid 1mg PO daily  MVI 1 tablet PO daily  Monitor VS and withdrawal symptoms  PRN Medications

## 2019-01-02 NOTE — CHART NOTE - NSCHARTNOTEFT_GEN_A_CORE
Subsequent Inpatient Encounter                                       Detox Unit    TALYA LOGAN   38y   Male      Chief Complaint:    Follow up for Alcohol  Dependency    HPI:     I reviewed previous notes. No Change, except if noted below.             Detail:_    ROS:   I reviewed with patient.  No changes from previous notes except if noted below.             Detail: _    PFSH I reviewed with patient. No changes from previous notes except if noted below.             Detail_    Medication reconciliation performed.    MEDICATIONS  (STANDING):  chlordiazePOXIDE   Oral   chlordiazePOXIDE 25 milliGRAM(s) Oral every 4 hours  chlordiazePOXIDE 20 milliGRAM(s) Oral every 4 hours  folic acid 1 milliGRAM(s) Oral daily  hydrocortisone 1% Cream 1 Application(s) Topical two times a day  multivitamin/minerals 1 Tablet(s) Oral daily  nicotine - 21 mG/24Hr(s) Patch 1 Patch Transdermal daily  thiamine 100 milliGRAM(s) Oral daily      MEDICATIONS  (PRN):  acetaminophen   Tablet .. 650 milliGRAM(s) Oral every 4 hours PRN Temp greater or equal to 38.5C (101.3F)  aluminum hydroxide/magnesium hydroxide/simethicone Suspension 30 milliLiter(s) Oral every 6 hours PRN Heartburn  bismuth subsalicylate Liquid 30 milliLiter(s) Oral every 6 hours PRN Diarrhea  chlordiazePOXIDE 50 milliGRAM(s) Oral every 4 hours PRN Withdrawal  cloNIDine 0.1 milliGRAM(s) Oral every 8 hours PRN Blood Pressure GREATER THAN 140/90 mmHG  guaiFENesin/dextromethorphan  Syrup 5 milliLiter(s) Oral every 4 hours PRN Cough  hydrOXYzine hydrochloride 50 milliGRAM(s) Oral every 6 hours PRN Anxiety  hydrOXYzine hydrochloride 100 milliGRAM(s) Oral at bedtime PRN insomnia  ibuprofen  Tablet. 600 milliGRAM(s) Oral every 6 hours PRN Mild Pain (1 - 3)  magnesium hydroxide Suspension 30 milliLiter(s) Oral once PRN Constipation  methocarbamol 500 milliGRAM(s) Oral every 6 hours PRN muscle pain  pseudoephedrine 60 milliGRAM(s) Oral every 6 hours PRN Rhinitis  trimethobenzamide 300 milliGRAM(s) Oral every 6 hours PRN Nausea and/or Vomiting  trimethobenzamide Injectable 200 milliGRAM(s) IntraMuscular every 6 hours PRN Nausea and/or Vomiting      T(C): 36.2 (01-02-19 @ 06:09), Max: 37.1 (19 @ 18:02)  HR: 75 (19 @ 06:09) (75 - 81)  BP: 119/66 (19 @ 06:09) (111/50 - 126/68)  RR: 16 (19 @ 06:09) (16 - 16)  SpO2: --    PHYSICAL EXAM:      Constitutional: NAD, A&O x3    Eyes: PERRLA, no conjuctivitis    Neck: no lymphadenopathy    Respiratory: +air entry, no rales, no rhonchi, no wheezes    Cardiovascular: +S1 and S2, regular rate and rhythm    Gastrointestinal: +BS, soft, non-tender, not distended    Extremities:  no edema, no calf tenderness    Skin: no rashes, normal turgor                            13.8   3.62  )-----------( 164      ( 31 Dec 2018 21:03 )             41.2       143  |  102  |  13  ----------------------------<  92  5.1<H>   |  32  |  0.8    Ca    9.8      31 Dec 2018 21:03  Mg     1.9         TPro  7.7  /  Alb  4.5  /  TBili  0.9  /  DBili  x   /  AST  27  /  ALT  19  /  AlkPhos  101    PT/INR - ( 31 Dec 2018 21:03 )   PT: 10.60 sec;   INR: 0.98 ratio         PTT - ( 31 Dec 2018 21:03 )  PTT:28.9 sec  Magnesium, Serum: 1.9 mg/dL (18 @ 21:03)  Ammonia, Serum: 30 umol/L (18 @ 21:03)  Amylase, Serum Total: 40 U/L (18 @ 21:03)  Hemoglobin A1C, Whole Blood: 4.8 % (18 @ 21:03)  Treponema Pallidum Antibody Interpretation: Negative (18 @ 21:03)  Hepatitis B Surface Antigen: Nonreact (18 @ 21:03)  Hepatitis C Virus S/CO Ratio: 0.08 S/CO (18 @ 21:03)    Hepatitis C Virus Interpretation: Nonreact (18 @ 21:03)      Urinalysis Basic - ( 31 Dec 2018 12:41 )    Color: Yellow / Appearance: Clear / S.025 / pH: x  Gluc: x / Ketone: Negative  / Bili: Negative / Urobili: 0.2 mg/dL   Blood: x / Protein: Negative mg/dL / Nitrite: Negative   Leuk Esterase: Negative / RBC: 6-10 /HPF / WBC 3-5 /HPF   Sq Epi: x / Non Sq Epi: x / Bacteria: x    Drug Screen 1, Urine Result: Done (18 @ 12:41)        Impression and Plan:    Primary Diagnosis:  Alcohol Dependency                                Medication: Librium Protocol/ CIWA Protocol    Secondary Diagnosis:                                                                  Medication:    Tertiary Diagnosis:                                                                       Medication      Continue Detox Protocols. Use of PRNS as needed for withdrawal and comfort.    Adjustments to protocols:    Labs/ Tests reviewed.    Tests ordered:     Likely Disposition: __x_Home       ___Rehab       ___Outpatient Program    ___Self Help     _____Other    Estimated Length of stay:__4__

## 2019-01-03 LAB
GAMMA INTERFERON BACKGROUND BLD IA-ACNC: 0.08 IU/ML — SIGNIFICANT CHANGE UP
M TB IFN-G BLD-IMP: NEGATIVE — SIGNIFICANT CHANGE UP
M TB IFN-G CD4+ BCKGRND COR BLD-ACNC: 0.01 IU/ML — SIGNIFICANT CHANGE UP
M TB IFN-G CD4+CD8+ BCKGRND COR BLD-ACNC: 0.01 IU/ML — SIGNIFICANT CHANGE UP
QUANT TB PLUS MITOGEN MINUS NIL: 7.72 IU/ML — SIGNIFICANT CHANGE UP

## 2019-01-03 RX ADMIN — Medication 1 MILLIGRAM(S): at 09:21

## 2019-01-03 RX ADMIN — Medication 10 MILLIGRAM(S): at 18:09

## 2019-01-03 RX ADMIN — Medication 1 APPLICATION(S): at 21:25

## 2019-01-03 RX ADMIN — Medication 20 MILLIGRAM(S): at 05:37

## 2019-01-03 RX ADMIN — Medication 20 MILLIGRAM(S): at 09:21

## 2019-01-03 RX ADMIN — Medication 1 APPLICATION(S): at 09:21

## 2019-01-03 RX ADMIN — Medication 1 TABLET(S): at 09:21

## 2019-01-03 RX ADMIN — Medication 10 MILLIGRAM(S): at 13:20

## 2019-01-03 RX ADMIN — Medication 10 MILLIGRAM(S): at 21:25

## 2019-01-03 NOTE — CHART NOTE - NSCHARTNOTEFT_GEN_A_CORE
Subsequent Inpatient Encounter                                       Detox Unit    TALYA LOGAN   38y   Male      Chief Complaint:    Follow up for Alcohol  Dependency    HPI:     I reviewed previous notes. No Change, except if noted below.             Detail:_    ROS:   I reviewed with patient.  No changes from previous notes except if noted below.             Detail: _    PFSH I reviewed with patient. No changes from previous notes except if noted below.             Detail_    Medication reconciliation performed.    MEDICATIONS  (STANDING):  chlordiazePOXIDE   Oral   chlordiazePOXIDE 25 milliGRAM(s) Oral every 4 hours  chlordiazePOXIDE 20 milliGRAM(s) Oral every 4 hours  folic acid 1 milliGRAM(s) Oral daily  hydrocortisone 1% Cream 1 Application(s) Topical two times a day  multivitamin/minerals 1 Tablet(s) Oral daily  nicotine - 21 mG/24Hr(s) Patch 1 Patch Transdermal daily  thiamine 100 milliGRAM(s) Oral daily      MEDICATIONS  (PRN):  acetaminophen   Tablet .. 650 milliGRAM(s) Oral every 4 hours PRN Temp greater or equal to 38.5C (101.3F)  aluminum hydroxide/magnesium hydroxide/simethicone Suspension 30 milliLiter(s) Oral every 6 hours PRN Heartburn  bismuth subsalicylate Liquid 30 milliLiter(s) Oral every 6 hours PRN Diarrhea  chlordiazePOXIDE 50 milliGRAM(s) Oral every 4 hours PRN Withdrawal  cloNIDine 0.1 milliGRAM(s) Oral every 8 hours PRN Blood Pressure GREATER THAN 140/90 mmHG  guaiFENesin/dextromethorphan  Syrup 5 milliLiter(s) Oral every 4 hours PRN Cough  hydrOXYzine hydrochloride 50 milliGRAM(s) Oral every 6 hours PRN Anxiety  hydrOXYzine hydrochloride 100 milliGRAM(s) Oral at bedtime PRN insomnia  ibuprofen  Tablet. 600 milliGRAM(s) Oral every 6 hours PRN Mild Pain (1 - 3)  magnesium hydroxide Suspension 30 milliLiter(s) Oral once PRN Constipation  methocarbamol 500 milliGRAM(s) Oral every 6 hours PRN muscle pain  pseudoephedrine 60 milliGRAM(s) Oral every 6 hours PRN Rhinitis  trimethobenzamide 300 milliGRAM(s) Oral every 6 hours PRN Nausea and/or Vomiting  trimethobenzamide Injectable 200 milliGRAM(s) IntraMuscular every 6 hours PRN Nausea and/or Vomiting      T(C): 36.2 (01-02-19 @ 06:09), Max: 37.1 (19 @ 18:02)  HR: 75 (19 @ 06:09) (75 - 81)  BP: 119/66 (19 @ 06:09) (111/50 - 126/68)  RR: 16 (19 @ 06:09) (16 - 16)  SpO2: --    PHYSICAL EXAM:      Constitutional: NAD, A&O x3    Eyes: PERRLA, no conjuctivitis    Neck: no lymphadenopathy    Respiratory: +air entry, no rales, no rhonchi, no wheezes    Cardiovascular: +S1 and S2, regular rate and rhythm    Gastrointestinal: +BS, soft, non-tender, not distended    Extremities:  no edema, no calf tenderness    Skin: no rashes, normal turgor                            13.8   3.62  )-----------( 164      ( 31 Dec 2018 21:03 )             41.2       143  |  102  |  13  ----------------------------<  92  5.1<H>   |  32  |  0.8    Ca    9.8      31 Dec 2018 21:03  Mg     1.9         TPro  7.7  /  Alb  4.5  /  TBili  0.9  /  DBili  x   /  AST  27  /  ALT  19  /  AlkPhos  101    PT/INR - ( 31 Dec 2018 21:03 )   PT: 10.60 sec;   INR: 0.98 ratio         PTT - ( 31 Dec 2018 21:03 )  PTT:28.9 sec  Magnesium, Serum: 1.9 mg/dL (18 @ 21:03)  Ammonia, Serum: 30 umol/L (18 @ 21:03)  Amylase, Serum Total: 40 U/L (18 @ 21:03)  Hemoglobin A1C, Whole Blood: 4.8 % (18 @ 21:03)  Treponema Pallidum Antibody Interpretation: Negative (18 @ 21:03)  Hepatitis B Surface Antigen: Nonreact (18 @ 21:03)  Hepatitis C Virus S/CO Ratio: 0.08 S/CO (18 @ 21:03)    Hepatitis C Virus Interpretation: Nonreact (18 @ 21:03)      Urinalysis Basic - ( 31 Dec 2018 12:41 )    Color: Yellow / Appearance: Clear / S.025 / pH: x  Gluc: x / Ketone: Negative  / Bili: Negative / Urobili: 0.2 mg/dL   Blood: x / Protein: Negative mg/dL / Nitrite: Negative   Leuk Esterase: Negative / RBC: 6-10 /HPF / WBC 3-5 /HPF   Sq Epi: x / Non Sq Epi: x / Bacteria: x    Drug Screen 1, Urine Result: Done (18 @ 12:41)        Impression and Plan:    Primary Diagnosis:  Alcohol Dependency                                Medication: Librium Protocol    Secondary Diagnosis:                                                                  Medication:    Tertiary Diagnosis:                                                                       Medication      Continue Detox Protocols. Use of PRNS as needed for withdrawal and comfort.    Adjustments to protocols:    Labs/ Tests reviewed.    Tests ordered:     Likely Disposition: __x_Home       ___Rehab       ___Outpatient Program    ___Self Help     _____Other    Estimated Length of stay:__4__

## 2019-01-04 ENCOUNTER — INPATIENT (INPATIENT)
Facility: HOSPITAL | Age: 39
LOS: 13 days | Discharge: HOME | End: 2019-01-18
Attending: INTERNAL MEDICINE | Admitting: INTERNAL MEDICINE

## 2019-01-04 VITALS
RESPIRATION RATE: 16 BRPM | WEIGHT: 164.91 LBS | HEIGHT: 70 IN | DIASTOLIC BLOOD PRESSURE: 74 MMHG | SYSTOLIC BLOOD PRESSURE: 126 MMHG | TEMPERATURE: 97 F | HEART RATE: 92 BPM

## 2019-01-04 VITALS
DIASTOLIC BLOOD PRESSURE: 73 MMHG | SYSTOLIC BLOOD PRESSURE: 114 MMHG | RESPIRATION RATE: 15 BRPM | TEMPERATURE: 98 F | HEART RATE: 86 BPM

## 2019-01-04 DIAGNOSIS — F10.20 ALCOHOL DEPENDENCE, UNCOMPLICATED: ICD-10-CM

## 2019-01-04 PROBLEM — F10.239 ALCOHOL DEPENDENCE WITH WITHDRAWAL, UNSPECIFIED: Chronic | Status: ACTIVE | Noted: 2018-12-31

## 2019-01-04 PROBLEM — F10.929 ALCOHOL USE, UNSPECIFIED WITH INTOXICATION, UNSPECIFIED: Chronic | Status: ACTIVE | Noted: 2018-12-31

## 2019-01-04 PROBLEM — K75.9 INFLAMMATORY LIVER DISEASE, UNSPECIFIED: Chronic | Status: ACTIVE | Noted: 2018-12-31

## 2019-01-04 PROBLEM — K85.20 ALCOHOL INDUCED ACUTE PANCREATITIS WITHOUT NECROSIS OR INFECTION: Chronic | Status: ACTIVE | Noted: 2018-12-31

## 2019-01-04 RX ORDER — MAGNESIUM HYDROXIDE 400 MG/1
30 TABLET, CHEWABLE ORAL ONCE
Qty: 0 | Refills: 0 | Status: DISCONTINUED | OUTPATIENT
Start: 2019-01-04 | End: 2019-01-18

## 2019-01-04 RX ORDER — HYDROXYZINE HCL 10 MG
100 TABLET ORAL AT BEDTIME
Qty: 0 | Refills: 0 | Status: DISCONTINUED | OUTPATIENT
Start: 2019-01-04 | End: 2019-01-18

## 2019-01-04 RX ORDER — PSEUDOEPHEDRINE HCL 30 MG
60 TABLET ORAL EVERY 6 HOURS
Qty: 0 | Refills: 0 | Status: DISCONTINUED | OUTPATIENT
Start: 2019-01-04 | End: 2019-01-18

## 2019-01-04 RX ORDER — METHOCARBAMOL 500 MG/1
500 TABLET, FILM COATED ORAL EVERY 6 HOURS
Qty: 0 | Refills: 0 | Status: DISCONTINUED | OUTPATIENT
Start: 2019-01-04 | End: 2019-01-18

## 2019-01-04 RX ORDER — HYDROXYZINE HCL 10 MG
50 TABLET ORAL EVERY 6 HOURS
Qty: 0 | Refills: 0 | Status: DISCONTINUED | OUTPATIENT
Start: 2019-01-04 | End: 2019-01-18

## 2019-01-04 RX ORDER — IBUPROFEN 200 MG
400 TABLET ORAL EVERY 8 HOURS
Qty: 0 | Refills: 0 | Status: DISCONTINUED | OUTPATIENT
Start: 2019-01-04 | End: 2019-01-18

## 2019-01-04 RX ORDER — HYDROCORTISONE 1 %
1 OINTMENT (GRAM) TOPICAL DAILY
Qty: 0 | Refills: 0 | Status: DISCONTINUED | OUTPATIENT
Start: 2019-01-04 | End: 2019-01-04

## 2019-01-04 RX ORDER — ACETAMINOPHEN 500 MG
650 TABLET ORAL EVERY 8 HOURS
Qty: 0 | Refills: 0 | Status: DISCONTINUED | OUTPATIENT
Start: 2019-01-04 | End: 2019-01-18

## 2019-01-04 RX ORDER — HYDROCORTISONE 1 %
1 OINTMENT (GRAM) TOPICAL DAILY
Qty: 0 | Refills: 0 | Status: COMPLETED | OUTPATIENT
Start: 2019-01-04 | End: 2019-01-09

## 2019-01-04 RX ORDER — MULTIVIT-MIN/FERROUS GLUCONATE 9 MG/15 ML
1 LIQUID (ML) ORAL DAILY
Qty: 0 | Refills: 0 | Status: DISCONTINUED | OUTPATIENT
Start: 2019-01-04 | End: 2019-01-18

## 2019-01-04 RX ADMIN — Medication 100 MILLIGRAM(S): at 00:45

## 2019-01-04 RX ADMIN — Medication 10 MILLIGRAM(S): at 06:14

## 2019-01-04 RX ADMIN — Medication 50 MILLIGRAM(S): at 21:04

## 2019-01-04 RX ADMIN — Medication 10 MILLIGRAM(S): at 02:07

## 2019-01-04 RX ADMIN — Medication 1 MILLIGRAM(S): at 09:00

## 2019-01-04 RX ADMIN — Medication 10 MILLIGRAM(S): at 09:00

## 2019-01-04 RX ADMIN — Medication 1 TABLET(S): at 09:00

## 2019-01-04 NOTE — CHART NOTE - NSCHARTNOTEFT_GEN_A_CORE
The patient was admitted to the inpt detox unit CDU, for   ETOH__x__ Opioid___  Benzo____Polysubstance _____ Dependency.    Pt was admitted from ED____, Intake__x__, Med/surg Floor_______.    Details are present in the preceding History & Physical section and follow up chart notes.  patient was evaluated on daily detox team  rounds.  Withdrawal symptoms and signs were reviewed on a daily basis, and the protocols were adjusted accordingly.    Labs and imaging results were reviewed and discussed with the patient.    All questions from the patient were addressed.  The patient was seen by the Chemical dependency counselors, and different options for after care were discussed.  The patient attended groups, meetings and 1:1 sessions with the counselors.  Narcane Kit was offered and instructions given prior to discharge.    Psychiatry consultation reviewed______, N/A__x____    Physical therapy evaluation reviewed_____, N/A_x___    Pt was given copies of labs and imaging reports, if applicable.    Prescriptions if needed, were sent through [facility] system to the pharmacy amnd are noted in the discharge instruction sheet.    After care was arranged by counselors and pt was discharged to:    Home___, Outpt. Program___, Rehab _x__, Long term____ [facility] ____ [facility]____ SNF____, AMA___, Admin Discharge____    Principal Diagnosis: Alcohol Dependency_x_   Opioid Dependency___ Benzo Dependency____ Polysubstance Dependency____

## 2019-01-05 RX ADMIN — Medication 1 TABLET(S): at 08:51

## 2019-01-05 RX ADMIN — Medication 100 MILLIGRAM(S): at 23:51

## 2019-01-05 RX ADMIN — Medication 50 MILLIGRAM(S): at 17:03

## 2019-01-05 RX ADMIN — Medication 1 APPLICATION(S): at 08:50

## 2019-01-05 RX ADMIN — Medication 50 MILLIGRAM(S): at 08:52

## 2019-01-06 RX ADMIN — Medication 1 TABLET(S): at 08:42

## 2019-01-06 RX ADMIN — Medication 1 APPLICATION(S): at 08:43

## 2019-01-06 RX ADMIN — Medication 100 MILLIGRAM(S): at 22:52

## 2019-01-06 RX ADMIN — Medication 50 MILLIGRAM(S): at 18:44

## 2019-01-06 RX ADMIN — Medication 50 MILLIGRAM(S): at 08:43

## 2019-01-07 DIAGNOSIS — F10.20 ALCOHOL DEPENDENCE, UNCOMPLICATED: ICD-10-CM

## 2019-01-07 DIAGNOSIS — F39 UNSPECIFIED MOOD [AFFECTIVE] DISORDER: ICD-10-CM

## 2019-01-07 RX ORDER — CITALOPRAM 10 MG/1
10 TABLET, FILM COATED ORAL DAILY
Qty: 0 | Refills: 0 | Status: COMPLETED | OUTPATIENT
Start: 2019-01-08 | End: 2019-01-10

## 2019-01-07 RX ORDER — CITALOPRAM 10 MG/1
20 TABLET, FILM COATED ORAL DAILY
Qty: 0 | Refills: 0 | Status: DISCONTINUED | OUTPATIENT
Start: 2019-01-11 | End: 2019-01-14

## 2019-01-07 RX ORDER — NALTREXONE HYDROCHLORIDE 50 MG/1
50 TABLET, FILM COATED ORAL DAILY
Qty: 0 | Refills: 0 | Status: DISCONTINUED | OUTPATIENT
Start: 2019-01-10 | End: 2019-01-18

## 2019-01-07 RX ORDER — TRAZODONE HCL 50 MG
50 TABLET ORAL DAILY
Qty: 0 | Refills: 0 | Status: DISCONTINUED | OUTPATIENT
Start: 2019-01-07 | End: 2019-01-08

## 2019-01-07 RX ADMIN — Medication 50 MILLIGRAM(S): at 23:32

## 2019-01-07 RX ADMIN — Medication 1 APPLICATION(S): at 08:16

## 2019-01-07 RX ADMIN — Medication 1 TABLET(S): at 08:16

## 2019-01-07 RX ADMIN — Medication 50 MILLIGRAM(S): at 08:17

## 2019-01-07 RX ADMIN — Medication 50 MILLIGRAM(S): at 21:08

## 2019-01-07 RX ADMIN — Medication 100 MILLIGRAM(S): at 23:38

## 2019-01-07 NOTE — BEHAVIORAL HEALTH ASSESSMENT NOTE - NSBHSOCIALHXDETAILSFT_PSY_A_CORE
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

## 2019-01-07 NOTE — BEHAVIORAL HEALTH ASSESSMENT NOTE - SUMMARY
37 yo SWM w AUD, depressive sx and anxiety while on rehab. Currently reports poor sleep, anxiety, intense boredom. Verbalizes motivation for change. 39 yo SWM w AUD, depressive sx and anxiety while on rehab. Currently reports poor sleep, anxiety, intense boredom. Verbalizes motivation for change. Agrees to start Naltrexone, Celexa, and Trazodone for sleep. Risks and benefits reviewed with pt and pt verbalized understanding.

## 2019-01-07 NOTE — BEHAVIORAL HEALTH ASSESSMENT NOTE - NSBHCONSULTMEDS_PSY_A_CORE FT
Celexa 10 mg po daily for depression/anxiety, titrate to goal dose of 20-30 mg   Naltrexone 50 mg po daily for MAT start 1/10/18  Trazodone 50 mg po hs for insomnia

## 2019-01-07 NOTE — BEHAVIORAL HEALTH ASSESSMENT NOTE - DETAILS
history of "uncontrollable shaking" for a few minutes but with no LOC maternal grandfather w etoh dep

## 2019-01-07 NOTE — BEHAVIORAL HEALTH ASSESSMENT NOTE - HPI (INCLUDE ILLNESS QUALITY, SEVERITY, DURATION, TIMING, CONTEXT, MODIFYING FACTORS, ASSOCIATED SIGNS AND SYMPTOMS)
w Alcohol Use Disorder. Pt reports initially starting use of substances at age 16. Pt has history of 7 mos of sobriety, which is longest sobriety while participating in [facility] program but is unsure when this was. Pt admits to daily use "it was drink all day, wake up in the middle of the night with the shakes, drink some more, take a shower, drink some more, just drink drink drink." Has intermittently participated in AA since age 24 when he first started to seek assistance for addiction, but also admits to incomplete adherence "I never got a sponsor. I was like 50/50".   Pt mostly has had psych contact [facility] substance treatment. He has gone to an outpt provider in the past but admits to poor follow up "never more than 5 sessions".   Reports current mood as "not that good". Feels that he has been to "so many programs that it feels repetitive. I know what they're gonna say. Not that I follow what they tell me". Is appreciative that he is getting time away from alcohol but also describes feeling "trapped" while on unit. Describes poor sleep, interrupted often. 39 yo SWM w Alcohol Use Disorder, reporting anxiety and depressive sx to counselor and past history of psychotropic trials. . Pt reports initially starting use of substances at age 16 in setting of anxiety symptoms "I was shy around girls. I was an introvert but when I was drinking I became an extrovert. I've always been a worrier." Pt has history of 7 mos of sobriety, which is longest sobriety while participating in [employer] program but is unsure when this was. Pt admits to daily use "it was drink all day, wake up in the middle of the night with the shakes, drink some more, take a shower, drink some more, just drink drink drink." Pt reports drinking 1 L of vodka and case of beer. Has been to >25 detoxes. Most recently completed detox at [facility] w 2 mos of subsequent sobriety and relapsed several weeks ago. Has intermittently participated in AA since age 24 when he first started to seek assistance for addiction, but also admits to incomplete adherence "I never got a sponsor. I was like 50/50".   Pt mostly has had psych contact [facility] substance treatment. He has gone to an outpt provider in the past but admits to poor follow up "never more than 4 sessions". Denies history of psychosis, denies sx c/w chalo. Has tried Prozac "for a few months", didn't notice benefit or side effects. Has also tried Lexapro, Wellbutrin.   Reports current mood as "not that good". Feels that he has been to "so many programs that it feels repetitive. I know what they're gonna say. Not that I follow what they tell me". Is appreciative that he is getting time away from alcohol but also describes feeling "trapped" while on unit. Describes poor sleep, interrupted often. Feels bored on unit but states that being here is "the right thing to do". Has good energy "I could walk out of here and go right to the gym", has been eating well. Has history of anxiety, particularly in social situations 39 yo SWM w Alcohol Use Disorder, reporting anxiety and depressive sx to counselor and past history of psychotropic trials. . Pt reports initially starting use of substances at age 16 in setting of anxiety symptoms "I was shy around girls. I was an introvert but when I was drinking I became an extrovert. I've always been a worrier." Pt reports he still succeeded in HS and graduated and got into a good college but in college started to not go to class and drink daily.  starPt has history of 7 mos of sobriety, which is longest sobriety while participating in [employer] program but is unsure when this was. Pt admits to daily use "it was drink all day, wake up in the middle of the night with the shakes, drink some more, take a shower, drink some more, just drink drink drink." Pt reports drinking 1 L of vodka and case of beer. Has been to >25 detoxes. Most recently completed detox at [facility] w 2 mos of subsequent sobriety and relapsed several weeks ago. Has intermittently participated in AA since age 24 when he first started to seek assistance for addiction, but also admits to incomplete adherence "I never got a sponsor. I was like 50/50".   Pt mostly has had psych contact [facility] substance treatment. He has gone to an outpt provider in the past but admits to poor follow up "never more than 4 sessions". Denies history of psychosis, denies sx c/w chalo. Has tried Prozac "for a few months", didn't notice benefit or side effects. Has also tried Lexapro, Wellbutrin.   Reports current mood as "not that good". Feels that he has been to "so many programs that it feels repetitive. I know what they're gonna say. Not that I follow what they tell me". Is appreciative that he is getting time away from alcohol but also describes feeling "trapped" while on unit. Describes poor sleep, interrupted often. Feels bored on unit but states that being here is "the right thing to do". Has good energy "I could walk out of here and go right to the gym", has been eating well. Has history of anxiety, particularly in social situations.

## 2019-01-08 DIAGNOSIS — F41.9 ANXIETY DISORDER, UNSPECIFIED: ICD-10-CM

## 2019-01-08 DIAGNOSIS — F10.20 ALCOHOL DEPENDENCE, UNCOMPLICATED: ICD-10-CM

## 2019-01-08 RX ORDER — TRAZODONE HCL 50 MG
25 TABLET ORAL AT BEDTIME
Qty: 0 | Refills: 0 | Status: DISCONTINUED | OUTPATIENT
Start: 2019-01-08 | End: 2019-01-10

## 2019-01-08 RX ADMIN — Medication 1 APPLICATION(S): at 08:38

## 2019-01-08 RX ADMIN — Medication 50 MILLIGRAM(S): at 16:20

## 2019-01-08 RX ADMIN — Medication 100 MILLIGRAM(S): at 23:11

## 2019-01-08 RX ADMIN — CITALOPRAM 10 MILLIGRAM(S): 10 TABLET, FILM COATED ORAL at 08:39

## 2019-01-08 RX ADMIN — Medication 1 TABLET(S): at 08:38

## 2019-01-08 RX ADMIN — Medication 50 MILLIGRAM(S): at 08:40

## 2019-01-09 RX ADMIN — Medication 50 MILLIGRAM(S): at 08:33

## 2019-01-09 RX ADMIN — Medication 100 MILLIGRAM(S): at 23:12

## 2019-01-09 RX ADMIN — Medication 50 MILLIGRAM(S): at 17:09

## 2019-01-09 RX ADMIN — Medication 1 TABLET(S): at 08:33

## 2019-01-09 RX ADMIN — Medication 1 APPLICATION(S): at 08:34

## 2019-01-09 RX ADMIN — CITALOPRAM 10 MILLIGRAM(S): 10 TABLET, FILM COATED ORAL at 08:33

## 2019-01-10 RX ADMIN — Medication 1 TABLET(S): at 08:57

## 2019-01-10 RX ADMIN — NALTREXONE HYDROCHLORIDE 50 MILLIGRAM(S): 50 TABLET, FILM COATED ORAL at 08:58

## 2019-01-10 RX ADMIN — Medication 50 MILLIGRAM(S): at 08:58

## 2019-01-10 RX ADMIN — CITALOPRAM 10 MILLIGRAM(S): 10 TABLET, FILM COATED ORAL at 08:57

## 2019-01-10 RX ADMIN — Medication 50 MILLIGRAM(S): at 17:26

## 2019-01-10 RX ADMIN — Medication 100 MILLIGRAM(S): at 23:15

## 2019-01-11 RX ADMIN — NALTREXONE HYDROCHLORIDE 50 MILLIGRAM(S): 50 TABLET, FILM COATED ORAL at 08:45

## 2019-01-11 RX ADMIN — Medication 100 MILLIGRAM(S): at 23:48

## 2019-01-11 RX ADMIN — CITALOPRAM 20 MILLIGRAM(S): 10 TABLET, FILM COATED ORAL at 08:43

## 2019-01-11 RX ADMIN — Medication 1 TABLET(S): at 08:43

## 2019-01-11 RX ADMIN — Medication 50 MILLIGRAM(S): at 08:43

## 2019-01-12 RX ADMIN — NALTREXONE HYDROCHLORIDE 50 MILLIGRAM(S): 50 TABLET, FILM COATED ORAL at 07:57

## 2019-01-12 RX ADMIN — CITALOPRAM 20 MILLIGRAM(S): 10 TABLET, FILM COATED ORAL at 07:57

## 2019-01-12 RX ADMIN — Medication 100 MILLIGRAM(S): at 23:28

## 2019-01-12 RX ADMIN — Medication 50 MILLIGRAM(S): at 07:57

## 2019-01-12 RX ADMIN — Medication 1 TABLET(S): at 07:57

## 2019-01-13 RX ADMIN — NALTREXONE HYDROCHLORIDE 50 MILLIGRAM(S): 50 TABLET, FILM COATED ORAL at 11:34

## 2019-01-13 RX ADMIN — Medication 100 MILLIGRAM(S): at 23:32

## 2019-01-13 RX ADMIN — Medication 50 MILLIGRAM(S): at 14:31

## 2019-01-13 RX ADMIN — Medication 1 TABLET(S): at 11:34

## 2019-01-14 RX ORDER — CITALOPRAM 10 MG/1
20 TABLET, FILM COATED ORAL AT BEDTIME
Qty: 0 | Refills: 0 | Status: DISCONTINUED | OUTPATIENT
Start: 2019-01-14 | End: 2019-01-14

## 2019-01-14 RX ORDER — FLUOXETINE HCL 10 MG
20 CAPSULE ORAL DAILY
Qty: 0 | Refills: 0 | Status: COMPLETED | OUTPATIENT
Start: 2019-01-15 | End: 2019-01-17

## 2019-01-14 RX ORDER — FLUOXETINE HCL 10 MG
30 CAPSULE ORAL DAILY
Qty: 0 | Refills: 0 | Status: DISCONTINUED | OUTPATIENT
Start: 2019-01-18 | End: 2019-01-18

## 2019-01-14 RX ADMIN — Medication 100 MILLIGRAM(S): at 22:44

## 2019-01-14 RX ADMIN — Medication 50 MILLIGRAM(S): at 08:50

## 2019-01-14 RX ADMIN — NALTREXONE HYDROCHLORIDE 50 MILLIGRAM(S): 50 TABLET, FILM COATED ORAL at 08:50

## 2019-01-14 RX ADMIN — Medication 1 TABLET(S): at 08:50

## 2019-01-15 RX ADMIN — NALTREXONE HYDROCHLORIDE 50 MILLIGRAM(S): 50 TABLET, FILM COATED ORAL at 09:00

## 2019-01-15 RX ADMIN — Medication 100 MILLIGRAM(S): at 23:16

## 2019-01-15 RX ADMIN — Medication 20 MILLIGRAM(S): at 09:00

## 2019-01-15 RX ADMIN — Medication 1 TABLET(S): at 09:00

## 2019-01-16 RX ADMIN — Medication 100 MILLIGRAM(S): at 23:13

## 2019-01-16 RX ADMIN — NALTREXONE HYDROCHLORIDE 50 MILLIGRAM(S): 50 TABLET, FILM COATED ORAL at 09:00

## 2019-01-16 RX ADMIN — Medication 1 TABLET(S): at 09:00

## 2019-01-16 RX ADMIN — Medication 20 MILLIGRAM(S): at 09:00

## 2019-01-17 VITALS
RESPIRATION RATE: 20 BRPM | TEMPERATURE: 96 F | HEART RATE: 62 BPM | SYSTOLIC BLOOD PRESSURE: 119 MMHG | DIASTOLIC BLOOD PRESSURE: 68 MMHG

## 2019-01-17 RX ADMIN — Medication 100 MILLIGRAM(S): at 23:28

## 2019-01-17 RX ADMIN — Medication 20 MILLIGRAM(S): at 09:11

## 2019-01-17 RX ADMIN — Medication 1 TABLET(S): at 09:11

## 2019-01-17 RX ADMIN — NALTREXONE HYDROCHLORIDE 50 MILLIGRAM(S): 50 TABLET, FILM COATED ORAL at 09:11

## 2019-01-18 RX ORDER — FLUOXETINE HCL 10 MG
3 CAPSULE ORAL
Qty: 42 | Refills: 0 | OUTPATIENT
Start: 2019-01-18 | End: 2019-01-31

## 2019-01-18 RX ORDER — NALTREXONE HYDROCHLORIDE 50 MG/1
1 TABLET, FILM COATED ORAL
Qty: 14 | Refills: 0 | OUTPATIENT
Start: 2019-01-18 | End: 2019-01-31

## 2019-01-18 RX ADMIN — Medication 30 MILLIGRAM(S): at 09:07

## 2019-01-18 RX ADMIN — NALTREXONE HYDROCHLORIDE 50 MILLIGRAM(S): 50 TABLET, FILM COATED ORAL at 09:07

## 2019-01-18 RX ADMIN — Medication 1 TABLET(S): at 09:07

## 2019-01-18 NOTE — CHART NOTE - NSCHARTNOTEFT_GEN_A_CORE
PA notified to discharge patient as per attending.  Case discussed with Dr. Robledo  Patient stable no changes.  Patient agreeable to discharge.        T(C): 35.6 (01-17-19 @ 23:29), Max: 35.6 (01-17-19 @ 23:29)  HR: 62 (01-17-19 @ 23:29) (62 - 62)  BP: 119/68 (01-17-19 @ 23:29) (119/68 - 119/68)  RR: 20 (01-17-19 @ 23:29) (20 - 20)  SpO2: --        Patient discharged home. RN aware and will manage.

## 2019-01-23 DIAGNOSIS — K75.9 INFLAMMATORY LIVER DISEASE, UNSPECIFIED: ICD-10-CM

## 2019-01-23 DIAGNOSIS — Z59.0 HOMELESSNESS: ICD-10-CM

## 2019-01-23 DIAGNOSIS — F10.20 ALCOHOL DEPENDENCE, UNCOMPLICATED: ICD-10-CM

## 2019-01-23 DIAGNOSIS — Z51.89 ENCOUNTER FOR OTHER SPECIFIED AFTERCARE: ICD-10-CM

## 2019-01-23 DIAGNOSIS — K85.20 ALCOHOL INDUCED ACUTE PANCREATITIS WITHOUT NECROSIS OR INFECTION: ICD-10-CM

## 2019-01-23 DIAGNOSIS — F41.9 ANXIETY DISORDER, UNSPECIFIED: ICD-10-CM

## 2019-01-23 DIAGNOSIS — Z56.0 UNEMPLOYMENT, UNSPECIFIED: ICD-10-CM

## 2019-01-23 DIAGNOSIS — I10 ESSENTIAL (PRIMARY) HYPERTENSION: ICD-10-CM

## 2019-01-23 DIAGNOSIS — K21.9 GASTRO-ESOPHAGEAL REFLUX DISEASE WITHOUT ESOPHAGITIS: ICD-10-CM

## 2019-01-23 SDOH — ECONOMIC STABILITY - HOUSING INSECURITY: HOMELESSNESS: Z59.0

## 2019-01-23 SDOH — ECONOMIC STABILITY - INCOME SECURITY: UNEMPLOYMENT, UNSPECIFIED: Z56.0

## 2019-07-08 NOTE — BEHAVIORAL HEALTH ASSESSMENT NOTE - THOUGHT CONTENT
PSO  LOV 4/23/2019  NOV 7/9/2019  Last refill claritin 10mg on n/a qty n/a + n/a RF        Please advise if okay to fill with qty and how many refills.     
Unremarkable

## 2020-01-07 NOTE — BEHAVIORAL HEALTH ASSESSMENT NOTE - PERCEPTIONS
Patient returns today now having completed 10 years of invasive treatments conservative therapy for underlying superficial venous insufficiency. Overall presenting symptoms remain unchanged.    Physical exam confirms previous CEAP (Clinical, Etiology, Anatomy, Pathophysiology) classification 3 on the left and 2 on the right.    Upon completion, the patient was counseled as to the ultrasound results.     IMPRESSION:  1. No evidence for deep venous insufficiency.  2. No evidence for deep venous thrombosis.  3. Right great saphenous vein clinically absent consistent with prior ablation and regional 3 mm recurrent varicosities along the anterior shin and calf.  4. Left great saphenous vein sufficiency beginning 4 cm distal to the saphenofemoral junction or than one half second and continuing down to the below-knee calf with 2-3 mm anterior shin varicosities.  6. Left small saphenous vein clinically absent consistent with prior ablation.  7. Prominent lateral calf and popliteal crease varix measuring up to 0.42 cm with recurrent patency from recent thrombophlebitis.    Assessment and Plan:  67 year old male with superficial venous insufficiency of the  Left great saphenous vein with associated pain, thrombophlebitis and edema.      Due to this venous insufficiency he demonstrates moderate to severe pain that causes functional impairment with decreased capacity to move and perform physical activities without taking breaks while attending to work performance and activities of daily living.    We discussed an overall vein treatment plan and the patient was given a treatment folder with handouts to review. To include endovenous ablation left great saphenous vein phlebectomy of the left posterior calf varix and possible residual sclerotherapy on both the right and left shin and calf for symptomatic recurrent varicosities.    The risks, benefits and alternatives to the outlined vein treatment plan were reviewed with the  patient including:  Itching, hyperpigmentation, telangiectatic matting, pain, bruising, superficial thrombus formation, minor or major allergic reaction, ulceration, anaphylaxis, deep venous thrombosis (DVT), pulmonary embolism (PE), dry cough, chest discomfort, and transient visual changes to include stroke.      He understands that no guarantee has been given as to the results that may be obtained regarding the final appearance or cure of the patient's venous disorder.    Patient has otherwise been adherent to conservative measures without meaningful relief of symptoms which are continuing to impact on their daily activities.  Treatment meets medical necessity for intervention to improve symptom relief and to slow disease progression.  The patient desires to proceed and asks us to seek preauthorization for intervention.       Total of 40 minutes was spent with the patient, more than half of which was devoted to discussion of an overall therapeutic plan separate and distinct from the ultrasound evaluation.    No abnormalities

## 2020-08-19 NOTE — BEHAVIORAL HEALTH ASSESSMENT NOTE - LANGUAGE
Refill request received for:  Bupropion  mg 1 daily   # 90 with 2 refills given 11-11-19  Last ov 7-29-19   No abnormalities noted

## 2022-02-05 ENCOUNTER — EMERGENCY (EMERGENCY)
Facility: HOSPITAL | Age: 42
LOS: 1 days | Discharge: LEFT BEFORE DISCHARGE | End: 2022-02-05
Attending: EMERGENCY MEDICINE | Admitting: EMERGENCY MEDICINE
Payer: SELF-PAY

## 2022-02-05 VITALS
RESPIRATION RATE: 16 BRPM | DIASTOLIC BLOOD PRESSURE: 86 MMHG | TEMPERATURE: 98 F | SYSTOLIC BLOOD PRESSURE: 141 MMHG | OXYGEN SATURATION: 96 % | HEART RATE: 981 BPM

## 2022-02-05 DIAGNOSIS — Z53.21 PROCEDURE AND TREATMENT NOT CARRIED OUT DUE TO PATIENT LEAVING PRIOR TO BEING SEEN BY HEALTH CARE PROVIDER: ICD-10-CM

## 2022-02-05 DIAGNOSIS — R41.82 ALTERED MENTAL STATUS, UNSPECIFIED: ICD-10-CM

## 2022-02-05 DIAGNOSIS — F10.20 ALCOHOL DEPENDENCE, UNCOMPLICATED: ICD-10-CM

## 2022-02-05 PROCEDURE — 99283 EMERGENCY DEPT VISIT LOW MDM: CPT

## 2022-02-05 NOTE — ED PROVIDER NOTE - OBJECTIVE STATEMENT
42 yo M, here after falling at [facility];  Pt is accompanied by friend who states that pt has hx alcohol abuse; drinking heavily for 3 weeks, last drink immediately prior to arrival;  States he is a neighbor and trying to help the patient and was bringing him back to [facility] via [facility] when he fell.  States pt had refused to get on train and police were called;  pt did not hit head;  pt is requesting detox

## 2022-02-05 NOTE — ED PROVIDER NOTE - PHYSICAL EXAMINATION
CONSTITUTIONAL:  Speech is clearly; appears like he has been drinking  HEAD: Normocephalic; atraumatic.   EYES: PERRL; EOM intact; conjunctiva and sclera clear; no conjunctival pallor  ENT: normal nose;  airway patent  NECK: Supple; non-tender; no stiffness  CARDIOVASCULAR:  Regular rate and rhythm.   RESPIRATORY: Breathing easily; breath sounds clear and equal bilaterally; no wheezes, rhonchi, or rales.  GI: Soft; non-distended; non-tender;   EXT: No cyanosis or edema; N/V intact.  no tremors;    SKIN: Normal for age and race; warm; dry; good turgor; no apparent lesions or rash. no cyanosis; abrasion to right finger tip  NEURO: Alert and oriented; face symmetric; grossly unremarkable.   Sensation grossly intact; moving all extremities.  ambulatory with steady gait,   PSYCHOLOGICAL: The patient’s mood and manner are appropriate.

## 2022-02-05 NOTE — ED ADULT TRIAGE NOTE - CCCP TRG CHIEF CMPLNT
RHEUMATOLOGY    Dr. Nico Byers    [facility]  [address] [place] [address]    Our new phone number for Rheumatology is [phone], this number will be able to help you schedule appointments for Dr. Byers or if you have any message you would like sent to us.    Thank you for choosing [facility]!    Samantha Morales [facility]                     altered mental status

## 2022-02-05 NOTE — ED PROVIDER NOTE - CLINICAL SUMMARY MEDICAL DECISION MAKING FREE TEXT BOX
42 yo M, hx alcohol dependence, here seeking detox; recent alcohol intake; no evidence of withdrawal at this time

## 2022-02-05 NOTE — ED PROVIDER NOTE - PROGRESS NOTE DETAILS
Patient stating he wants detox, advised he does not qualify for detox at this time, also no detox facility here;  Pt not in withdrawal at this time, pt just drank alcohol prior to arrival.    Pt and friend discussing leaving and heading to [facility] where he will go to detox;  pt left with friend without any further notification

## 2025-02-13 ENCOUNTER — OFFICE (OUTPATIENT)
Dept: URBAN - METROPOLITAN AREA CLINIC 109 | Facility: CLINIC | Age: 45
Setting detail: OPHTHALMOLOGY
End: 2025-02-13
Payer: MEDICAID

## 2025-02-13 DIAGNOSIS — H11.32: ICD-10-CM

## 2025-02-13 DIAGNOSIS — S01.112D: ICD-10-CM

## 2025-02-13 PROCEDURE — 99203 OFFICE O/P NEW LOW 30 MIN: CPT | Performed by: OPHTHALMOLOGY

## 2025-02-13 ASSESSMENT — TONOMETRY
OD_IOP_MMHG: 14
OS_IOP_MMHG: 14

## 2025-02-13 ASSESSMENT — VISUAL ACUITY
OD_BCVA: 20/30-2
OS_BCVA: 20/25-2